# Patient Record
Sex: FEMALE | Race: WHITE | NOT HISPANIC OR LATINO | Employment: FULL TIME | URBAN - METROPOLITAN AREA
[De-identification: names, ages, dates, MRNs, and addresses within clinical notes are randomized per-mention and may not be internally consistent; named-entity substitution may affect disease eponyms.]

---

## 2017-01-09 DIAGNOSIS — E03.9 ACQUIRED HYPOTHYROIDISM: ICD-10-CM

## 2017-01-09 RX ORDER — LEVOTHYROXINE SODIUM 0.05 MG/1
TABLET ORAL
Qty: 90 TABLET | Refills: 0 | Status: SHIPPED | OUTPATIENT
Start: 2017-01-09 | End: 2017-03-31 | Stop reason: SDUPTHER

## 2017-01-10 DIAGNOSIS — I10 BENIGN ESSENTIAL HYPERTENSION: ICD-10-CM

## 2017-01-11 RX ORDER — LISINOPRIL AND HYDROCHLOROTHIAZIDE 25; 20 MG/1; MG/1
TABLET ORAL
Qty: 30 TABLET | Refills: 0 | Status: SHIPPED | OUTPATIENT
Start: 2017-01-11 | End: 2017-02-05 | Stop reason: SDUPTHER

## 2017-02-05 DIAGNOSIS — I10 BENIGN ESSENTIAL HYPERTENSION: ICD-10-CM

## 2017-02-06 RX ORDER — LISINOPRIL AND HYDROCHLOROTHIAZIDE 25; 20 MG/1; MG/1
TABLET ORAL
Qty: 30 TABLET | Refills: 0 | Status: SHIPPED | OUTPATIENT
Start: 2017-02-06 | End: 2017-03-13 | Stop reason: SDUPTHER

## 2017-03-13 DIAGNOSIS — I10 BENIGN ESSENTIAL HYPERTENSION: ICD-10-CM

## 2017-03-14 RX ORDER — LISINOPRIL AND HYDROCHLOROTHIAZIDE 25; 20 MG/1; MG/1
TABLET ORAL
Qty: 15 TABLET | Refills: 0 | Status: SHIPPED | OUTPATIENT
Start: 2017-03-14 | End: 2017-03-31 | Stop reason: SDUPTHER

## 2017-03-31 ENCOUNTER — OFFICE VISIT (OUTPATIENT)
Dept: FAMILY MEDICINE CLINIC | Facility: CLINIC | Age: 52
End: 2017-03-31

## 2017-03-31 VITALS
HEIGHT: 67 IN | BODY MASS INDEX: 28.88 KG/M2 | SYSTOLIC BLOOD PRESSURE: 128 MMHG | OXYGEN SATURATION: 98 % | WEIGHT: 184 LBS | HEART RATE: 65 BPM | DIASTOLIC BLOOD PRESSURE: 72 MMHG

## 2017-03-31 DIAGNOSIS — E03.9 ACQUIRED HYPOTHYROIDISM: ICD-10-CM

## 2017-03-31 DIAGNOSIS — Z00.00 HEALTHCARE MAINTENANCE: ICD-10-CM

## 2017-03-31 DIAGNOSIS — I10 BENIGN ESSENTIAL HYPERTENSION: Primary | ICD-10-CM

## 2017-03-31 LAB
ALBUMIN SERPL-MCNC: 4.7 G/DL (ref 3.5–5.2)
ALBUMIN/GLOB SERPL: 2 G/DL
ALP SERPL-CCNC: 67 U/L (ref 39–117)
ALT SERPL-CCNC: 48 U/L (ref 1–33)
AST SERPL-CCNC: 24 U/L (ref 1–32)
BASOPHILS # BLD AUTO: 0.02 10*3/MM3 (ref 0–0.2)
BASOPHILS NFR BLD AUTO: 0.3 % (ref 0–1.5)
BILIRUB SERPL-MCNC: 0.5 MG/DL (ref 0.1–1.2)
BUN SERPL-MCNC: 15 MG/DL (ref 6–20)
BUN/CREAT SERPL: 18.8 (ref 7–25)
CALCIUM SERPL-MCNC: 9.9 MG/DL (ref 8.6–10.5)
CHLORIDE SERPL-SCNC: 100 MMOL/L (ref 98–107)
CHOLEST SERPL-MCNC: 210 MG/DL (ref 0–200)
CO2 SERPL-SCNC: 29.4 MMOL/L (ref 22–29)
CREAT SERPL-MCNC: 0.8 MG/DL (ref 0.57–1)
EOSINOPHIL # BLD AUTO: 0.08 10*3/MM3 (ref 0–0.7)
EOSINOPHIL NFR BLD AUTO: 1 % (ref 0.3–6.2)
ERYTHROCYTE [DISTWIDTH] IN BLOOD BY AUTOMATED COUNT: 14.5 % (ref 11.7–13)
GLOBULIN SER CALC-MCNC: 2.3 GM/DL
GLUCOSE SERPL-MCNC: 96 MG/DL (ref 65–99)
HCT VFR BLD AUTO: 41.7 % (ref 35.6–45.5)
HDLC SERPL-MCNC: 66 MG/DL (ref 40–60)
HGB BLD-MCNC: 13.4 G/DL (ref 11.9–15.5)
IMM GRANULOCYTES # BLD: 0.03 10*3/MM3 (ref 0–0.03)
IMM GRANULOCYTES NFR BLD: 0.4 % (ref 0–0.5)
LDLC SERPL CALC-MCNC: 128 MG/DL (ref 0–100)
LDLC/HDLC SERPL: 1.94 {RATIO}
LYMPHOCYTES # BLD AUTO: 2.04 10*3/MM3 (ref 0.9–4.8)
LYMPHOCYTES NFR BLD AUTO: 26.6 % (ref 19.6–45.3)
MCH RBC QN AUTO: 28.7 PG (ref 26.9–32)
MCHC RBC AUTO-ENTMCNC: 32.1 G/DL (ref 32.4–36.3)
MCV RBC AUTO: 89.3 FL (ref 80.5–98.2)
MONOCYTES # BLD AUTO: 0.5 10*3/MM3 (ref 0.2–1.2)
MONOCYTES NFR BLD AUTO: 6.5 % (ref 5–12)
NEUTROPHILS # BLD AUTO: 5 10*3/MM3 (ref 1.9–8.1)
NEUTROPHILS NFR BLD AUTO: 65.2 % (ref 42.7–76)
PLATELET # BLD AUTO: 326 10*3/MM3 (ref 140–500)
POTASSIUM SERPL-SCNC: 4.1 MMOL/L (ref 3.5–5.2)
PROT SERPL-MCNC: 7 G/DL (ref 6–8.5)
RBC # BLD AUTO: 4.67 10*6/MM3 (ref 3.9–5.2)
SODIUM SERPL-SCNC: 141 MMOL/L (ref 136–145)
TRIGL SERPL-MCNC: 79 MG/DL (ref 0–150)
TSH SERPL DL<=0.005 MIU/L-ACNC: 2.75 MIU/ML (ref 0.27–4.2)
VLDLC SERPL CALC-MCNC: 15.8 MG/DL (ref 5–40)
WBC # BLD AUTO: 7.67 10*3/MM3 (ref 4.5–10.7)

## 2017-03-31 PROCEDURE — 99213 OFFICE O/P EST LOW 20 MIN: CPT | Performed by: FAMILY MEDICINE

## 2017-03-31 RX ORDER — LEVOTHYROXINE SODIUM 0.05 MG/1
TABLET ORAL
Qty: 90 TABLET | Refills: 0 | Status: SHIPPED | OUTPATIENT
Start: 2017-03-31 | End: 2017-04-03 | Stop reason: SDUPTHER

## 2017-03-31 RX ORDER — LISINOPRIL AND HYDROCHLOROTHIAZIDE 25; 20 MG/1; MG/1
1 TABLET ORAL DAILY
Qty: 90 TABLET | Refills: 1 | Status: SHIPPED | OUTPATIENT
Start: 2017-03-31 | End: 2017-09-23 | Stop reason: SDUPTHER

## 2017-03-31 NOTE — PROGRESS NOTES
"Subjective   Brigida Bejarano is a 51 y.o. female.     Chief Complaint   Patient presents with   • Hyperlipidemia   • Hypothyroidism   • Med Refill     Social History   Substance Use Topics   • Smoking status: Never Smoker   • Smokeless tobacco: None   • Alcohol use None       History of Present Illness     She is doing OK. She is taking her meds. BP is good. She does check it on a regular basis. It is 105-115/70 at home.     The following portions of the patient's history were reviewed and updated as appropriate:PMHroutine: Social history , Allergies, Current Medications, Active Problem List and Health Maintenance    Review of Systems   Constitutional: Negative for activity change, appetite change, chills, fatigue, fever and unexpected weight change.   HENT: Negative for congestion, ear pain, hearing loss, nosebleeds, rhinorrhea and sore throat.    Eyes: Negative for pain, redness and visual disturbance.   Respiratory: Negative for cough, shortness of breath and wheezing.    Cardiovascular: Negative for chest pain, palpitations and leg swelling.   Gastrointestinal: Negative for abdominal pain, blood in stool, constipation, diarrhea, nausea and vomiting.   Endocrine: Negative for cold intolerance and heat intolerance.   Genitourinary: Negative for difficulty urinating, dysuria, frequency, hematuria, pelvic pain, urgency and vaginal discharge.   Musculoskeletal: Negative for arthralgias, back pain and joint swelling.   Skin: Negative for rash and wound.   Neurological: Negative for dizziness, weakness, numbness and headaches.   Hematological: Does not bruise/bleed easily.   Psychiatric/Behavioral: Negative for dysphoric mood, sleep disturbance and suicidal ideas. The patient is not nervous/anxious.        Objective   Vitals:    03/31/17 1143   BP: 128/72   Pulse: 65   SpO2: 98%   Weight: 184 lb (83.5 kg)   Height: 67\" (170.2 cm)     Body mass index is 28.82 kg/(m^2).    Physical Exam   Constitutional: She is " oriented to person, place, and time. Vital signs are normal. She appears well-developed and well-nourished. No distress.   HENT:   Head: Normocephalic.   Cardiovascular: Normal rate, regular rhythm and normal heart sounds.    Pulmonary/Chest: Effort normal and breath sounds normal.   Neurological: She is alert and oriented to person, place, and time. Gait normal.   Psychiatric: She has a normal mood and affect. Her behavior is normal. Judgment and thought content normal.   Vitals reviewed.      Assessment/Plan   Problem List Items Addressed This Visit        Cardiovascular and Mediastinum    Benign essential hypertension - Primary    Overview     3/31/2017  BP is controlled well. She will recheck in six months. She will continue present meds.           Relevant Medications    lisinopril-hydrochlorothiazide (PRINZIDE,ZESTORETIC) 20-25 MG per tablet       Endocrine    Hypothyroidism    Overview     Labs are drawn today          Relevant Orders    TSH      Other Visit Diagnoses     Healthcare maintenance        Relevant Orders    Comprehensive metabolic panel    Lipid Panel With LDL/HDL Ratio    CBC and Differential

## 2017-04-03 DIAGNOSIS — E03.9 ACQUIRED HYPOTHYROIDISM: ICD-10-CM

## 2017-04-03 RX ORDER — LEVOTHYROXINE SODIUM 0.05 MG/1
50 TABLET ORAL DAILY
Qty: 90 TABLET | Refills: 3 | Status: SHIPPED | OUTPATIENT
Start: 2017-04-03 | End: 2017-12-05 | Stop reason: SDUPTHER

## 2017-06-30 ENCOUNTER — TRANSCRIBE ORDERS (OUTPATIENT)
Dept: ADMINISTRATIVE | Facility: HOSPITAL | Age: 52
End: 2017-06-30

## 2017-06-30 DIAGNOSIS — Z12.31 VISIT FOR SCREENING MAMMOGRAM: Primary | ICD-10-CM

## 2017-07-20 ENCOUNTER — HOSPITAL ENCOUNTER (OUTPATIENT)
Dept: MAMMOGRAPHY | Facility: HOSPITAL | Age: 52
Discharge: HOME | End: 2017-07-20
Admitting: FAMILY MEDICINE

## 2017-07-20 DIAGNOSIS — Z12.31 VISIT FOR SCREENING MAMMOGRAM: ICD-10-CM

## 2017-07-20 PROCEDURE — 77063 BREAST TOMOSYNTHESIS BI: CPT

## 2017-07-20 PROCEDURE — G0202 SCR MAMMO BI INCL CAD: HCPCS

## 2017-08-11 ENCOUNTER — APPOINTMENT (OUTPATIENT)
Dept: MAMMOGRAPHY | Facility: HOSPITAL | Age: 52
End: 2017-08-11

## 2017-09-23 DIAGNOSIS — I10 BENIGN ESSENTIAL HYPERTENSION: ICD-10-CM

## 2017-09-25 RX ORDER — LISINOPRIL AND HYDROCHLOROTHIAZIDE 25; 20 MG/1; MG/1
TABLET ORAL
Qty: 30 TABLET | Refills: 0 | Status: SHIPPED | OUTPATIENT
Start: 2017-09-25 | End: 2017-10-25 | Stop reason: SDUPTHER

## 2017-10-25 DIAGNOSIS — I10 BENIGN ESSENTIAL HYPERTENSION: ICD-10-CM

## 2017-10-26 RX ORDER — LISINOPRIL AND HYDROCHLOROTHIAZIDE 25; 20 MG/1; MG/1
TABLET ORAL
Qty: 15 TABLET | Refills: 0 | Status: SHIPPED | OUTPATIENT
Start: 2017-10-26 | End: 2017-11-14 | Stop reason: SDUPTHER

## 2017-11-13 DIAGNOSIS — I10 BENIGN ESSENTIAL HYPERTENSION: ICD-10-CM

## 2017-11-13 RX ORDER — LISINOPRIL AND HYDROCHLOROTHIAZIDE 25; 20 MG/1; MG/1
TABLET ORAL
Qty: 15 TABLET | Refills: 0 | OUTPATIENT
Start: 2017-11-13

## 2017-11-14 ENCOUNTER — TELEPHONE (OUTPATIENT)
Dept: FAMILY MEDICINE CLINIC | Facility: CLINIC | Age: 52
End: 2017-11-14

## 2017-11-14 DIAGNOSIS — I10 BENIGN ESSENTIAL HYPERTENSION: ICD-10-CM

## 2017-11-14 RX ORDER — LISINOPRIL AND HYDROCHLOROTHIAZIDE 25; 20 MG/1; MG/1
1 TABLET ORAL DAILY
Qty: 30 TABLET | Refills: 0 | Status: SHIPPED | OUTPATIENT
Start: 2017-11-14 | End: 2017-12-05 | Stop reason: SDUPTHER

## 2017-12-05 ENCOUNTER — OFFICE VISIT (OUTPATIENT)
Dept: FAMILY MEDICINE CLINIC | Facility: CLINIC | Age: 52
End: 2017-12-05

## 2017-12-05 VITALS
WEIGHT: 184 LBS | HEART RATE: 63 BPM | HEIGHT: 67 IN | DIASTOLIC BLOOD PRESSURE: 82 MMHG | BODY MASS INDEX: 28.88 KG/M2 | OXYGEN SATURATION: 99 % | SYSTOLIC BLOOD PRESSURE: 120 MMHG

## 2017-12-05 DIAGNOSIS — Z00.00 HEALTHCARE MAINTENANCE: ICD-10-CM

## 2017-12-05 DIAGNOSIS — I10 BENIGN ESSENTIAL HYPERTENSION: Primary | ICD-10-CM

## 2017-12-05 DIAGNOSIS — E03.9 ACQUIRED HYPOTHYROIDISM: ICD-10-CM

## 2017-12-05 PROCEDURE — 99213 OFFICE O/P EST LOW 20 MIN: CPT | Performed by: FAMILY MEDICINE

## 2017-12-05 RX ORDER — LEVOTHYROXINE SODIUM 0.05 MG/1
50 TABLET ORAL DAILY
Qty: 90 TABLET | Refills: 1 | Status: SHIPPED | OUTPATIENT
Start: 2017-12-05 | End: 2018-06-12 | Stop reason: SDUPTHER

## 2017-12-05 RX ORDER — LISINOPRIL AND HYDROCHLOROTHIAZIDE 25; 20 MG/1; MG/1
1 TABLET ORAL DAILY
Qty: 90 TABLET | Refills: 1 | Status: SHIPPED | OUTPATIENT
Start: 2017-12-05 | End: 2018-06-12 | Stop reason: SDUPTHER

## 2017-12-05 NOTE — PROGRESS NOTES
Brigida Bejarano is a 52 y.o. female.      Assessment/Plan   Problem List Items Addressed This Visit        Cardiovascular and Mediastinum    Benign essential hypertension - Primary    Overview     12/5/2017  BP is controlled well. She will recheck in six months. She will continue present meds.           Relevant Medications    lisinopril-hydrochlorothiazide (PRINZIDE,ZESTORETIC) 20-25 MG per tablet       Endocrine    Hypothyroidism    Overview     [facility] 12/5/2017  Labs due in six months.          Relevant Medications    levothyroxine (SYNTHROID, LEVOTHROID) 50 MCG tablet      Other Visit Diagnoses     Healthcare maintenance        Relevant Orders    Ambulatory Referral For Screening Colonoscopy             Return in about 6 months (around 6/5/2018) for Annual physical.  There are no Patient Instructions on file for this visit.    Chief Complaint   Patient presents with   • Hypertension   • Med Refill     Social History   Substance Use Topics   • Smoking status: Never Smoker   • Smokeless tobacco: Never Used   • Alcohol use None       History of Present Illness     Patient is here for follow-up of hypertension. She is not exercising and is adherent to a low-salt diet. Patient does check BP occasionally, it is in the 100's/70's normally.. Patient denies chest pain and dyspnea. Cardiovascular risk factors: hypertension. Use of agents associated with hypertension: none. History of target organ damage: none. She is compliant with meds.     She's been driving down to see her mom in [place] with a lot of medical problems.     The following portions of the patient's history were reviewed and updated as appropriate:PMHroutine: Social history , Allergies, Current Medications, Active Problem List and Health Maintenance    Review of Systems   Constitutional: Negative for activity change, appetite change, chills, fatigue, fever and unexpected weight change.   HENT: Negative for congestion, ear pain, hearing loss,  "nosebleeds, rhinorrhea and sore throat.    Eyes: Negative for pain, redness and visual disturbance.   Respiratory: Negative for cough, shortness of breath and wheezing.    Cardiovascular: Negative for chest pain, palpitations and leg swelling.   Gastrointestinal: Negative for abdominal pain, blood in stool, constipation, diarrhea, nausea and vomiting.   Endocrine: Negative for cold intolerance and heat intolerance.   Genitourinary: Negative for difficulty urinating, dysuria, frequency, hematuria, pelvic pain, urgency and vaginal discharge.   Musculoskeletal: Negative for arthralgias, back pain and joint swelling.   Skin: Negative for rash and wound.   Neurological: Negative for dizziness, weakness, numbness and headaches.   Hematological: Does not bruise/bleed easily.   Psychiatric/Behavioral: Negative for dysphoric mood, sleep disturbance and suicidal ideas. The patient is not nervous/anxious.        Objective   Vitals:    12/05/17 1011   BP: 120/82   Pulse: 63   SpO2: 99%   Weight: 83.5 kg (184 lb)   Height: 170.2 cm (67.01\")     Body mass index is 28.81 kg/(m^2).  Physical Exam   Constitutional: She is oriented to person, place, and time. Vital signs are normal. She appears well-developed and well-nourished. No distress.   HENT:   Head: Normocephalic.   Cardiovascular: Normal rate, regular rhythm and normal heart sounds.    Pulmonary/Chest: Effort normal and breath sounds normal.   Musculoskeletal: She exhibits deformity (mild nodes on the little finger).   Neurological: She is alert and oriented to person, place, and time. Gait normal.   Psychiatric: She has a normal mood and affect. Her behavior is normal. Judgment and thought content normal.   Vitals reviewed.    Reviewed Data:  No visits with results within 1 Month(s) from this visit.  Latest known visit with results is:    Office Visit on 03/31/2017   Component Date Value Ref Range Status   • Glucose 03/31/2017 96  65 - 99 mg/dL Final   • BUN 03/31/2017 15  " 6 - 20 mg/dL Final   • Creatinine 03/31/2017 0.80  0.57 - 1.00 mg/dL Final   • eGFR Non African Am 03/31/2017 76  >60 mL/min/1.73 Final   • eGFR African Am 03/31/2017 92  >60 mL/min/1.73 Final   • BUN/Creatinine Ratio 03/31/2017 18.8  7.0 - 25.0 Final   • Sodium 03/31/2017 141  136 - 145 mmol/L Final   • Potassium 03/31/2017 4.1  3.5 - 5.2 mmol/L Final   • Chloride 03/31/2017 100  98 - 107 mmol/L Final   • Total CO2 03/31/2017 29.4* 22.0 - 29.0 mmol/L Final   • Calcium 03/31/2017 9.9  8.6 - 10.5 mg/dL Final   • Total Protein 03/31/2017 7.0  6.0 - 8.5 g/dL Final   • Albumin 03/31/2017 4.70  3.50 - 5.20 g/dL Final   • Globulin 03/31/2017 2.3  gm/dL Final   • A/G Ratio 03/31/2017 2.0  g/dL Final   • Total Bilirubin 03/31/2017 0.5  0.1 - 1.2 mg/dL Final   • Alkaline Phosphatase 03/31/2017 67  39 - 117 U/L Final   • AST (SGOT) 03/31/2017 24  1 - 32 U/L Final   • ALT (SGPT) 03/31/2017 48* 1 - 33 U/L Final   • Total Cholesterol 03/31/2017 210* 0 - 200 mg/dL Final   • Triglycerides 03/31/2017 79  0 - 150 mg/dL Final   • HDL Cholesterol 03/31/2017 66* 40 - 60 mg/dL Final   • VLDL Cholesterol 03/31/2017 15.8  5 - 40 mg/dL Final   • LDL Cholesterol  03/31/2017 128* 0 - 100 mg/dL Final   • LDL/HDL Ratio 03/31/2017 1.94   Final   • WBC 03/31/2017 7.67  4.50 - 10.70 10*3/mm3 Final   • RBC 03/31/2017 4.67  3.90 - 5.20 10*6/mm3 Final   • Hemoglobin 03/31/2017 13.4  11.9 - 15.5 g/dL Final   • Hematocrit 03/31/2017 41.7  35.6 - 45.5 % Final   • MCV 03/31/2017 89.3  80.5 - 98.2 fL Final   • MCH 03/31/2017 28.7  26.9 - 32.0 pg Final   • MCHC 03/31/2017 32.1* 32.4 - 36.3 g/dL Final   • RDW 03/31/2017 14.5* 11.7 - 13.0 % Final   • Platelets 03/31/2017 326  140 - 500 10*3/mm3 Final   • Neutrophil Rel % 03/31/2017 65.2  42.7 - 76.0 % Final   • Lymphocyte Rel % 03/31/2017 26.6  19.6 - 45.3 % Final   • Monocyte Rel % 03/31/2017 6.5  5.0 - 12.0 % Final   • Eosinophil Rel % 03/31/2017 1.0  0.3 - 6.2 % Final   • Basophil Rel % 03/31/2017 0.3   0.0 - 1.5 % Final   • Neutrophils Absolute 03/31/2017 5.00  1.90 - 8.10 10*3/mm3 Final   • Lymphocytes Absolute 03/31/2017 2.04  0.90 - 4.80 10*3/mm3 Final   • Monocytes Absolute 03/31/2017 0.50  0.20 - 1.20 10*3/mm3 Final   • Eosinophils Absolute 03/31/2017 0.08  0.00 - 0.70 10*3/mm3 Final   • Basophils Absolute 03/31/2017 0.02  0.00 - 0.20 10*3/mm3 Final   • Immature Granulocyte Rel % 03/31/2017 0.4  0.0 - 0.5 % Final   • Immature Grans Absolute 03/31/2017 0.03  0.00 - 0.03 10*3/mm3 Final   • TSH 03/31/2017 2.750  0.270 - 4.200 mIU/mL Final

## 2018-04-08 DIAGNOSIS — E03.9 ACQUIRED HYPOTHYROIDISM: ICD-10-CM

## 2018-04-09 RX ORDER — LEVOTHYROXINE SODIUM 0.05 MG/1
50 TABLET ORAL DAILY
Qty: 90 TABLET | Refills: 0 | Status: SHIPPED | OUTPATIENT
Start: 2018-04-09 | End: 2018-06-12 | Stop reason: SDUPTHER

## 2018-06-06 ENCOUNTER — TELEPHONE (OUTPATIENT)
Dept: FAMILY MEDICINE CLINIC | Facility: CLINIC | Age: 53
End: 2018-06-06

## 2018-06-12 DIAGNOSIS — E03.9 ACQUIRED HYPOTHYROIDISM: ICD-10-CM

## 2018-06-12 DIAGNOSIS — I10 BENIGN ESSENTIAL HYPERTENSION: ICD-10-CM

## 2018-06-12 RX ORDER — LEVOTHYROXINE SODIUM 0.05 MG/1
50 TABLET ORAL DAILY
Qty: 30 TABLET | Refills: 0 | Status: SHIPPED | OUTPATIENT
Start: 2018-06-12 | End: 2018-07-02 | Stop reason: SDUPTHER

## 2018-06-12 RX ORDER — LISINOPRIL AND HYDROCHLOROTHIAZIDE 25; 20 MG/1; MG/1
1 TABLET ORAL DAILY
Qty: 30 TABLET | Refills: 0 | Status: SHIPPED | OUTPATIENT
Start: 2018-06-12 | End: 2018-06-29 | Stop reason: SDUPTHER

## 2018-06-29 ENCOUNTER — OFFICE VISIT (OUTPATIENT)
Dept: FAMILY MEDICINE CLINIC | Facility: CLINIC | Age: 53
End: 2018-06-29

## 2018-06-29 VITALS
DIASTOLIC BLOOD PRESSURE: 84 MMHG | RESPIRATION RATE: 16 BRPM | SYSTOLIC BLOOD PRESSURE: 112 MMHG | HEART RATE: 64 BPM | BODY MASS INDEX: 30.13 KG/M2 | WEIGHT: 192 LBS | HEIGHT: 67 IN | OXYGEN SATURATION: 100 %

## 2018-06-29 DIAGNOSIS — E03.9 ACQUIRED HYPOTHYROIDISM: ICD-10-CM

## 2018-06-29 DIAGNOSIS — I10 BENIGN ESSENTIAL HYPERTENSION: ICD-10-CM

## 2018-06-29 LAB — TSH SERPL DL<=0.005 MIU/L-ACNC: 1.98 MIU/ML (ref 0.27–4.2)

## 2018-06-29 PROCEDURE — 99213 OFFICE O/P EST LOW 20 MIN: CPT | Performed by: FAMILY MEDICINE

## 2018-06-29 RX ORDER — LISINOPRIL AND HYDROCHLOROTHIAZIDE 25; 20 MG/1; MG/1
1 TABLET ORAL DAILY
Qty: 90 TABLET | Refills: 1 | Status: SHIPPED | OUTPATIENT
Start: 2018-06-29

## 2018-06-29 RX ORDER — LEVOTHYROXINE SODIUM 0.05 MG/1
50 TABLET ORAL DAILY
Qty: 90 TABLET | Refills: 1 | Status: CANCELLED | OUTPATIENT
Start: 2018-06-29

## 2018-06-29 NOTE — PROGRESS NOTES
Problem List Items Addressed This Visit        Cardiovascular and Mediastinum    Benign essential hypertension    Overview     6/29/2018  BP is controlled well. She will recheck in six months. She will continue present meds.  Bottom number is up a little and hopefully once she quits traveling she will be able to exercise and be  More healthy         Relevant Medications    lisinopril-hydrochlorothiazide (PRINZIDE,ZESTORETIC) 20-25 MG per tablet       Endocrine    Hypothyroidism    Current Assessment & Plan     [facility] 6/29/2018  Labs are drawn today.           Relevant Orders    TSH             Return in about 6 months (around 12/29/2018).    Brigida Bejarano is a 53 y.o. female being seen in our office today for Hypertension                 She  reports that she has never smoked. She has never used smokeless tobacco. She reports that she drinks alcohol. She reports that she does not use drugs.             HPI Patient is here for follow-up of hypertension. She is not exercising and is not adherent to a low-salt diet mostly due to her travelling to [place] monthly. Patient does not check BP at home.. Patient denies chest pain and dyspnea. Cardiovascular risk factors: obesity (BMI >= 30 kg/m2) and sedentary lifestyle. Use of agents associated with hypertension: none. History of target organ damage: none. She is compliant with meds.     She is moving to [place] at the end of next month. Ultimately in [facility].              The following portions of the patient's history were reviewed and updated as appropriate:PMHroutine: Social history , Allergies, Current Medications, Active Problem List and Health Maintenance            Review of Systems   Constitutional: Negative for activity change, appetite change, chills, fatigue, fever and unexpected weight change.   HENT: Negative for congestion, ear pain, hearing loss, nosebleeds, rhinorrhea and sore throat.    Eyes: Negative for pain, redness and visual disturbance.    Respiratory: Negative for cough, shortness of breath and wheezing.    Cardiovascular: Negative for chest pain, palpitations and leg swelling.   Gastrointestinal: Negative for abdominal pain, blood in stool, constipation, diarrhea, nausea and vomiting.   Endocrine: Negative for cold intolerance and heat intolerance.   Genitourinary: Negative for difficulty urinating, dysuria, frequency, hematuria, pelvic pain, urgency and vaginal discharge.   Musculoskeletal: Negative for arthralgias, back pain and joint swelling.   Skin: Negative for rash and wound.   Neurological: Negative for dizziness, weakness, numbness and headaches.   Hematological: Does not bruise/bleed easily.   Psychiatric/Behavioral: Negative for dysphoric mood, sleep disturbance and suicidal ideas. The patient is not nervous/anxious.                  BP Readings from Last 1 Encounters:   06/29/18 112/84     Wt Readings from Last 3 Encounters:   06/29/18 87.1 kg (192 lb)   12/05/17 83.5 kg (184 lb)   03/31/17 83.5 kg (184 lb)   Body mass index is 30.07 kg/m².                 Physical Exam   Constitutional: She is oriented to person, place, and time. Vital signs are normal. She appears well-developed and well-nourished. No distress.   HENT:   Head: Normocephalic.   Cardiovascular: Normal rate, regular rhythm and normal heart sounds.    Pulmonary/Chest: Effort normal and breath sounds normal.   Neurological: She is alert and oriented to person, place, and time. Gait normal.   Psychiatric: She has a normal mood and affect. Her behavior is normal. Judgment and thought content normal.   Vitals reviewed.

## 2018-07-02 DIAGNOSIS — E03.9 ACQUIRED HYPOTHYROIDISM: ICD-10-CM

## 2018-07-02 RX ORDER — LEVOTHYROXINE SODIUM 0.05 MG/1
50 TABLET ORAL DAILY
Qty: 90 TABLET | Refills: 3 | Status: SHIPPED | OUTPATIENT
Start: 2018-07-02

## 2021-03-30 ENCOUNTER — BULK ORDERING (OUTPATIENT)
Dept: CASE MANAGEMENT | Facility: OTHER | Age: 56
End: 2021-03-30

## 2021-03-30 DIAGNOSIS — Z23 IMMUNIZATION DUE: ICD-10-CM
